# Patient Record
Sex: FEMALE | URBAN - METROPOLITAN AREA
[De-identification: names, ages, dates, MRNs, and addresses within clinical notes are randomized per-mention and may not be internally consistent; named-entity substitution may affect disease eponyms.]

---

## 2021-09-08 ENCOUNTER — IMPORTED ENCOUNTER (OUTPATIENT)
Dept: URBAN - METROPOLITAN AREA CLINIC 31 | Facility: CLINIC | Age: 41
End: 2021-09-08

## 2021-09-08 PROCEDURE — 92134 CPTRZ OPH DX IMG PST SGM RTA: CPT

## 2021-09-08 PROCEDURE — 99204 OFFICE O/P NEW MOD 45 MIN: CPT

## 2021-09-08 NOTE — PATIENT DISCUSSION
1.  Iritis: recurrent episode OS: Discussed that steroid and dilating drops will help with discomfort and resolve intraocular inflammation. Call office imediately with decreased vision increased pain and/or increased redness. HLA B27 positive Continue pred gtts q2h w/a OS mac oct done today recommend establishing with PCP - recommend Dr Lopez Powell. Return for an appointment in 5 days for office call. with Dr. Celestino Buerger.

## 2021-09-09 PROBLEM — D31.32: Noted: 2021-09-09

## 2021-09-09 PROBLEM — D31.31: Noted: 2021-09-09

## 2021-09-09 PROBLEM — H20.022: Noted: 2021-09-08

## 2021-09-13 ENCOUNTER — IMPORTED ENCOUNTER (OUTPATIENT)
Dept: URBAN - METROPOLITAN AREA CLINIC 31 | Facility: CLINIC | Age: 41
End: 2021-09-13

## 2021-09-13 PROBLEM — H20.022: Noted: 2021-09-13

## 2021-09-13 PROCEDURE — 92015 DETERMINE REFRACTIVE STATE: CPT

## 2021-09-13 PROCEDURE — 99213 OFFICE O/P EST LOW 20 MIN: CPT

## 2021-09-13 NOTE — PATIENT DISCUSSION
1.  Iritis: recurrent episode OS: Discussed that steroid and dilating drops will help with discomfort and resolve intraocular inflammation. Call office imediately with decreased vision increased pain and/or increased redness. HLA B27 positive DECREASE  pred gtts qID w/a OS mac oct done [address] wnlrecommend establishing with PCP - recommend Dr Luiza Lowery. Return for an appointment in 7 days for office call. with Dr. Roman Payment.

## 2021-09-20 ENCOUNTER — IMPORTED ENCOUNTER (OUTPATIENT)
Dept: URBAN - METROPOLITAN AREA CLINIC 31 | Facility: CLINIC | Age: 41
End: 2021-09-20

## 2021-09-20 PROBLEM — H20.022: Noted: 2021-09-20

## 2021-09-20 PROCEDURE — 99213 OFFICE O/P EST LOW 20 MIN: CPT

## 2022-04-02 ASSESSMENT — TONOMETRY
OD_IOP_MMHG: 13
OS_IOP_MMHG: 16
OD_IOP_MMHG: 14
OS_IOP_MMHG: 15
OS_IOP_MMHG: 16
OD_IOP_MMHG: 14

## 2022-04-02 ASSESSMENT — VISUAL ACUITY
OD_CC: 20/50-2
OD_PH: SC 20/25 -2
OS_CC: 20/50-3
OD_CC: 20/40
OS_PH: SC 20/25 -3
OS_CC: 20/400
OS_CC: 20/200
OD_CC: 20/40